# Patient Record
Sex: FEMALE | Race: WHITE | URBAN - METROPOLITAN AREA
[De-identification: names, ages, dates, MRNs, and addresses within clinical notes are randomized per-mention and may not be internally consistent; named-entity substitution may affect disease eponyms.]

---

## 2019-11-06 ENCOUNTER — OFFICE VISIT (OUTPATIENT)
Dept: FAMILY MEDICINE CLINIC | Facility: CLINIC | Age: 49
End: 2019-11-06

## 2019-11-06 VITALS
TEMPERATURE: 98 F | DIASTOLIC BLOOD PRESSURE: 84 MMHG | RESPIRATION RATE: 16 BRPM | WEIGHT: 240 LBS | BODY MASS INDEX: 39.51 KG/M2 | HEIGHT: 65.5 IN | SYSTOLIC BLOOD PRESSURE: 120 MMHG | HEART RATE: 84 BPM

## 2019-11-06 DIAGNOSIS — Z83.49 FAMILY HISTORY OF THYROID DISEASE IN SISTER: ICD-10-CM

## 2019-11-06 DIAGNOSIS — Z11.1 SCREENING-PULMONARY TB: ICD-10-CM

## 2019-11-06 DIAGNOSIS — Z00.00 WELL ADULT EXAM: Primary | ICD-10-CM

## 2019-11-06 DIAGNOSIS — Z00.00 LABORATORY EXAMINATION ORDERED AS PART OF A ROUTINE GENERAL MEDICAL EXAMINATION: ICD-10-CM

## 2019-11-06 DIAGNOSIS — Z13.21 ENCOUNTER FOR VITAMIN DEFICIENCY SCREENING: ICD-10-CM

## 2019-11-06 DIAGNOSIS — Z12.31 VISIT FOR SCREENING MAMMOGRAM: ICD-10-CM

## 2019-11-06 PROCEDURE — 99386 PREV VISIT NEW AGE 40-64: CPT | Performed by: FAMILY MEDICINE

## 2019-11-06 PROCEDURE — 86580 TB INTRADERMAL TEST: CPT | Performed by: FAMILY MEDICINE

## 2019-11-06 NOTE — PROGRESS NOTES
:HPI:   Daniel Henriquez is a 52year old female who presents for a complete physical exam. Patient complains of     Establish Care (Re establish patient.)   Physical (Annual exam. Saw Tequila Raya in the past for gyne)   Imm/Inj (No flu vaccine.  Needs TB test f vision, changes in vision  HEENT: denies nasal congestion, sinus pain or ST, earache  LUNGS: denies shortness of breath with exertion  CARDIOVASCULAR: denies chest pain on exertion  GI: denies abdominal pain,denies heartburn  : denies dysuria, vaginal di and all orders for this visit:    Well adult exam    Visit for screening mammogram  -     [facility] SCREENING BILAT (CPT=77067); Future    Laboratory examination ordered as part of a routine general medical examination  -     COMP METABOLIC PANEL (14);  Future  -

## 2019-11-06 NOTE — PATIENT INSTRUCTIONS
4 Steps for Eating Healthier  Changing the way you eat can improve your health. It can lower your cholesterol and blood pressure, and help you stay at a healthy weight. Your diet doesn’t have to be bland and boring to be healthy.  Just watch your calories · Add beans and lentils to your meals. · Drink more water to match your fiber increase to help prevent constipation. Date Last Reviewed: 6/1/2017  © 7612-6533 [employer]. [address] [facility] [place] [address] [place] [address]. All rights reserved.  [facility]

## 2019-11-08 ENCOUNTER — NURSE ONLY (OUTPATIENT)
Dept: FAMILY MEDICINE CLINIC | Facility: CLINIC | Age: 49
End: 2019-11-08

## 2019-11-08 DIAGNOSIS — Z11.1 SCREENING FOR TUBERCULOSIS: Primary | ICD-10-CM

## 2019-11-08 NOTE — PROGRESS NOTES
HPI:    Patient ID: Sundar Nava is a 52year old female. Patient presents to the office today for her TB read. The test was administered on 11/6/2019 in the left forearm. The test is read today as 0 mm negative.       Review of Systems         Current

## 2020-06-24 ENCOUNTER — TELEPHONE (OUTPATIENT)
Dept: FAMILY MEDICINE CLINIC | Facility: CLINIC | Age: 50
End: 2020-06-24

## 2020-06-24 NOTE — TELEPHONE ENCOUNTER
LOV 11/6/2019 and at that time, pt said that she gets mammogram orders and pap through Dr Rafael Nolasco office. The last pap in Epic was 1/29/2015. No mammograms seen in Epic. Spoke with pt and informed her that Dr Cooper Ferguson has retired.     She states that sh

## 2020-07-16 ENCOUNTER — PATIENT OUTREACH (OUTPATIENT)
Dept: FAMILY MEDICINE CLINIC | Facility: CLINIC | Age: 50
End: 2020-07-16

## 2021-08-10 ENCOUNTER — TELEPHONE (OUTPATIENT)
Dept: FAMILY MEDICINE CLINIC | Facility: CLINIC | Age: 51
End: 2021-08-10

## 2021-08-10 NOTE — TELEPHONE ENCOUNTER
Patient is due for physical, mammogram, pap and colorectal cancer screening. Left message to call back [phone].

## 2022-04-29 ENCOUNTER — PATIENT OUTREACH (OUTPATIENT)
Dept: FAMILY MEDICINE CLINIC | Facility: CLINIC | Age: 52
End: 2022-04-29

## 2022-05-27 ENCOUNTER — TELEPHONE (OUTPATIENT)
Dept: FAMILY MEDICINE CLINIC | Facility: CLINIC | Age: 52
End: 2022-05-27

## 2022-05-27 NOTE — TELEPHONE ENCOUNTER
Can we call Pt and see if still our Pt- due for physical, pap, mammo, colonoscopy  LOV 11/6/19  Not responding to our outreach

## 2022-06-03 NOTE — TELEPHONE ENCOUNTER
Called patient and left message on machine to see if patient was still going to be a patient of our office, if so to please contact office and schedule appointment for physical

## 2022-06-15 NOTE — TELEPHONE ENCOUNTER
LM for Pt to call office and schedule annual physical. Also asked her to let us know if she being seen elsewhere and no longer needs an appt in our office.

## 2022-06-24 ENCOUNTER — TELEPHONE (OUTPATIENT)
Dept: FAMILY MEDICINE CLINIC | Facility: CLINIC | Age: 52
End: 2022-06-24

## 2022-06-24 NOTE — TELEPHONE ENCOUNTER
LOV 11/6/2019 with CORAZON CUENCA. No future appointments. Please call pt and assist her in scheduling PX. She is overdue for  Px, colon cancer screening, pap smear and mammogram.    She can see any provider in our office. Routed to front staff.

## 2022-06-24 NOTE — TELEPHONE ENCOUNTER
LM for patient to schedule her physical. She is overdue for it as well as mammogram, pap and colon cancer screening.

## 2022-06-27 NOTE — TELEPHONE ENCOUNTER
Left message asking patient to call and schedule her physical. She is overdue for it as well as mammogram, pap and colon cancer screening.

## 2022-09-12 ENCOUNTER — PATIENT OUTREACH (OUTPATIENT)
Dept: FAMILY MEDICINE CLINIC | Facility: CLINIC | Age: 52
End: 2022-09-12

## 2022-09-12 NOTE — PROGRESS NOTES
LOV 11/6/19 with CORAZON CUENCA. No future appointments. Pt is overdue for Px,pap, mammogram and colon cancer screening. Please call pt and assist her in scheduling Px with any of our providers. Routed to front staff.

## 2022-09-14 NOTE — PROGRESS NOTES
Please refer to Permanent Comments:    7/7/22. Pt is still established with Dr. Renae Anderson. Please do not reach out to pt to schedule Px appt. Pt's only day off is on Sundays and when her schedule changes, she will call back to schedule appt. Did not reach out to Pt. Pt does not want to be called.

## 2023-09-27 ENCOUNTER — TELEPHONE (OUTPATIENT)
Dept: FAMILY MEDICINE CLINIC | Facility: CLINIC | Age: 53
End: 2023-09-27

## 2023-09-27 NOTE — TELEPHONE ENCOUNTER
LM for patient to reach out to see if her schedule has changed and if she is still Dr. Alexander Arevalo' patient. If not please have her call her insurance to remove Dr. Alexander Arevalo as her PCP. If so she needs to schedule an appt.